# Patient Record
Sex: MALE | Race: WHITE | URBAN - METROPOLITAN AREA
[De-identification: names, ages, dates, MRNs, and addresses within clinical notes are randomized per-mention and may not be internally consistent; named-entity substitution may affect disease eponyms.]

---

## 2024-07-26 PROBLEM — I21.19 INFERIOR MYOCARDIAL INFARCTION (MULTI): Status: ACTIVE | Noted: 2024-07-26

## 2024-07-26 PROBLEM — Z86.79: Status: ACTIVE | Noted: 2024-07-26

## 2024-07-31 ENCOUNTER — TELEPHONE (OUTPATIENT)
Dept: CARDIOLOGY | Facility: CLINIC | Age: 64
End: 2024-07-31
Payer: COMMERCIAL

## 2024-08-09 ENCOUNTER — OFFICE VISIT (OUTPATIENT)
Dept: CARDIOLOGY | Facility: CLINIC | Age: 64
End: 2024-08-09
Payer: COMMERCIAL

## 2024-08-09 VITALS
OXYGEN SATURATION: 92 % | HEART RATE: 111 BPM | SYSTOLIC BLOOD PRESSURE: 145 MMHG | BODY MASS INDEX: 29.34 KG/M2 | HEIGHT: 71 IN | WEIGHT: 209.6 LBS | DIASTOLIC BLOOD PRESSURE: 93 MMHG

## 2024-08-09 DIAGNOSIS — I25.2 HISTORY OF ST ELEVATION MYOCARDIAL INFARCTION (STEMI): Primary | ICD-10-CM

## 2024-08-09 DIAGNOSIS — R73.03 PREDIABETES: ICD-10-CM

## 2024-08-09 DIAGNOSIS — I11.9 BENIGN HYPERTENSIVE HEART DISEASE WITHOUT CONGESTIVE HEART FAILURE: ICD-10-CM

## 2024-08-09 PROCEDURE — 3080F DIAST BP >= 90 MM HG: CPT | Performed by: HOSPITALIST

## 2024-08-09 PROCEDURE — 99214 OFFICE O/P EST MOD 30 MIN: CPT | Performed by: HOSPITALIST

## 2024-08-09 PROCEDURE — 3077F SYST BP >= 140 MM HG: CPT | Performed by: HOSPITALIST

## 2024-08-09 PROCEDURE — 93005 ELECTROCARDIOGRAM TRACING: CPT | Performed by: HOSPITALIST

## 2024-08-09 PROCEDURE — 3008F BODY MASS INDEX DOCD: CPT | Performed by: HOSPITALIST

## 2024-08-09 RX ORDER — METOPROLOL SUCCINATE 25 MG/1
25 TABLET, EXTENDED RELEASE ORAL DAILY
Qty: 30 TABLET | Refills: 11 | Status: SHIPPED | OUTPATIENT
Start: 2024-08-09 | End: 2025-08-09

## 2024-08-09 ASSESSMENT — COLUMBIA-SUICIDE SEVERITY RATING SCALE - C-SSRS
6. HAVE YOU EVER DONE ANYTHING, STARTED TO DO ANYTHING, OR PREPARED TO DO ANYTHING TO END YOUR LIFE?: NO
1. IN THE PAST MONTH, HAVE YOU WISHED YOU WERE DEAD OR WISHED YOU COULD GO TO SLEEP AND NOT WAKE UP?: NO
2. HAVE YOU ACTUALLY HAD ANY THOUGHTS OF KILLING YOURSELF?: NO

## 2024-08-09 NOTE — PROGRESS NOTES
Subjective   John Stringer is a 63 y.o. 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  Patient is here for routine follow-up.  Patient has been doing well since discharge, he denies any angina, or HANSEN.  He denies any orthopnea, PND, dizziness or palpitation.  He reports occasional mild pleuritic chest pain on deep breath.  He does not check his BP at home, BP today is elevated 145/93.  He quit smoking in 2023.  Patient stated that he is supposed to go for another procedure for his intracranial aneurysm, he also would like to relocate his cardiac care to [place] close to home. Patient is not sure what medication he is on, states that he is taking all 4 medications he was discharged on;  aspirin 81, Plavix 75, atorvastatin 80, and losartan 50 mg.    Blood pressure is 145/93 with a heart rate of 111.  Most recent labs from 7/26/2024 with creatinine of 0.87, and hemoglobin of 13.7, A1c of 5.8, LDL of 92, triglyceride of 74, and HDL of 38.    EKG from 8/9/2024, reviewed by myself, showed normal sinus rhythm with a heart of 98 bpm, otherwise unremarkable EKG [No Q waves in the inferior leads].    TTE from 7/26/2024  1. Left ventricular ejection fraction is normal, calculated by Arango's biplane at 55%.   2. Entire inferior wall is abnormal.   3. Spectral Doppler shows an abnormal pattern of left ventricular diastolic filling.   4. No left ventricular thrombus visualized.   5. There is normal right ventricular global systolic function.     PCI 7/25/2024  1. Inferior STEMI with thrombotic  occlusion of proximal-mid RCA, also 60% disease in distal LAD [small size vessel], otherwise mild CAD elsewhere in a right-dominant system.   2. Status post OCT-guided PCI to proximal-mid RCA using 2.75x28 mm LAURITA [post dilated using 3.0 NC balloon].   3. Continue ASA 81 mg for life, Plavix 600 mg load tomorrow then 75 mg afterwards [Will obtain records for intracranial aneurysm, no history of ICH].   4. Further management as per inpatient cardiology team.      Review of Systems  ROS is negative other than in HPI.      Objective   Physical Exam  General: NAD  HEENT: IEOM, PERRL   Neck: No JVD or carotid bruit  Lungs: CTAB  Heart: RRR, normal S1 and S2, no loud murmurs  Abdomen: Soft, nontender, positive bowel sounds  Extremities: No edema  Neurologic: No FND  Psychiatric: Normal mood and affect    Assessment/Plan   1-inferior STEMI:   -On 7/25/2024 with thrombotic occlusion of proximal-mid RCA status post OCT-guided PCI to proximal-mid RCA using 2.75x28 mm LAURITA [post dilated using 3.0 NC balloon, distal LAD is a small size vessel with 60% stenosis high-sensitivity troponin at 18 hours ~2700 but was not trended to peak, LVEF 55% with hypokinesis of the inferior wall].  -Patient denies any cardiovascular symptoms.  -Will repeat echocardiogram at 40 days from MI to ensure recovery of the inferior wall.  -Will refer to cardiac rehab.  -Continue aspirin 81 mg once daily for life, Plavix 75 mg for at least 12 months, and atorvastatin 80 mg.  Will add metoprolol.  -Healthy lifestyle notification risk factor control.    2-HTN:  -Elevated today, continue home meds, adding metoprolol as above.  -Patient to check his BP numbers at home and share with us in 1 week.    3-prediabetes:  -Emphasized the patient importance of healthy diet, exercise, and weight loss.      Chioma Suazo MD

## 2024-08-09 NOTE — PATIENT INSTRUCTIONS
Thank you so much for visiting us today.    Please continue taking aspirin 81 mg once daily for life and Plavix 75 mg once daily for at least 12 months along with other medications.    Please check your blood pressure at home, write numbers down, morning and evening, and share results with us in 1 week.    We are going to add metoprolol succinate to better protect your heart, 25 mg once daily.    We will repeat an echocardiogram in early September to look at your heart function and wall motion.    Will refer to cardiac rehab.    Please do your best to exercise a regular basis, please add aerobic cardio exercises, start at 5 to 10 minutes a day, ideally 30 minutes 5 times a week.    Please do your best to lose weight through diet and exercise.  Look up Mediterranean diet.  Skip breakfast if you can,  a window of 10 hours to eat a day [10 AM to 6 PM for example], do not eat after 6 PM at night.  Avoid food rich with sugar and carbohydrates like bread, pasta, rice, and potatoes.  Count your calories and try to be 500-calorie deficient a day, this way would lose 1 pound a week [3500 martha equals to 1 pound].    Please call us at [phone] if you have any questions or need help.

## 2024-08-11 LAB
ATRIAL RATE: 98 BPM
P AXIS: 51 DEGREES
P OFFSET: 175 MS
P ONSET: 117 MS
PR INTERVAL: 184 MS
Q ONSET: 209 MS
QRS COUNT: 17 BEATS
QRS DURATION: 90 MS
QT INTERVAL: 334 MS
QTC CALCULATION(BAZETT): 426 MS
QTC FREDERICIA: 393 MS
R AXIS: 40 DEGREES
T AXIS: 20 DEGREES
T OFFSET: 376 MS
VENTRICULAR RATE: 98 BPM

## 2024-08-12 ENCOUNTER — TELEPHONE (OUTPATIENT)
Dept: CARDIOLOGY | Facility: CLINIC | Age: 64
End: 2024-08-12
Payer: COMMERCIAL

## 2024-08-12 NOTE — TELEPHONE ENCOUNTER
[place] paperwork completed and faxed. Success confirmation received. TCT patient and left VMM regarding same

## 2024-12-02 ENCOUNTER — TELEPHONE (OUTPATIENT)
Dept: CARDIOLOGY | Facility: CLINIC | Age: 64
End: 2024-12-02
Payer: COMMERCIAL